# Patient Record
(demographics unavailable — no encounter records)

---

## 2024-11-12 NOTE — HISTORY OF PRESENT ILLNESS
[FreeTextEntry1] :  Patient presents for 3 month follow up. [de-identified] : IZABELLA DOAN is a 60 year female who presents today Nov 12, 2024 for 3 month follow up.   11/12/24: Doing well. She is switching GYN. She would like to start HRT. She has been doing a lot of menopause workshops. She is due for repeat bone density and mammogram. She has a wart on left ring finger. She has been putting compound W on it. It went away but came back.  8/13/24: She has been taking [place] thyroid to 75 mg/day, splitting the dose as follows: 60 mg am and 15 mg late afternoon as per Endocrinology recommendations. She has not missed any doses. She would like to no longer split the dose and take 75mg once a day in morning. She does not want to be switched to levothyroxine. She has osteoporosis not currently on medication treatment. She does weight bearing exercises 5-6 times a week. She recently saw cardiologist and has Us of carotid arteries and echocardiogram

## 2024-11-12 NOTE — PHYSICAL EXAM
[Normal] : normal rate, regular rhythm, normal S1 and S2 and no murmur heard [Coordination Grossly Intact] : coordination grossly intact [No Focal Deficits] : no focal deficits [Normal Gait] : normal gait [Normal Affect] : the affect was normal [Alert and Oriented x3] : oriented to person, place, and time [Normal Insight/Judgement] : insight and judgment were intact [de-identified] : +wart left ring finger

## 2024-11-12 NOTE — ASSESSMENT
[FreeTextEntry1] : IZABELLA DOAN is a 60 year female who presents today Nov 12, 2024 for 3 month follow up.   Hypothyroidism - recheck TFTs - Continue [place] thyroid 75mg/day splitting the dose as follows: 60 mg am and 15 mg late afternoon  Osteoporosis - Declined medication treatment - Repeat DEXA scan.  HLD - Aug 24: Total chol 240, , Triglycerides 82, HDL 82  Follow up in 3 months

## 2025-02-14 NOTE — ASSESSMENT
[FreeTextEntry1] : IZABELLA DOAN is a 60 year female who presents today Feb 14, 2025 for 3 month follow up for the following issues  HLD - Low fat diet - repeat lipids  Osteoporosis  - Bone Density Dec 2024 - Denies treatment at this time - Continue calcium, vitamin d, and weight training  Hypothyroidism - recheck tsh, t3, 74

## 2025-02-21 NOTE — CONSULT LETTER
[FreeTextEntry1] : Kirstin Michael [FreeTextEntry2] : hypothyroidism [FreeTextEntry3] : 59 year old female with h/o of hypothyroidism, who is on [place] thyroid (apparently declines treatment with levothyroxine).  Pt  last May had [place] thyroid changed to 60 mg in Am and 15 mg in PM   TSH was stable until recent labs showed TSH 5.55 Free T4 0.7  Free T3 4.41  PMH  Hypothyroidism  Osteoporosis refusing treatment  High cholesterol    PSH  blepharoplasty   Meds [place] Thyroid 60mg in Am and 15 mg in Afternoon  Estradiol cream   All   NKDA   [FreeTextEntry4] :  Pt withh/o hypothyroidism with variable  TFT  over past few years.  Pt had been fairly stable until recently. As noted with prior econsult- it can be very difficult to adjust [place] thyroid given variability between batches produced and Higher T3 content.    Would repeat TSH Free T4 Free T3 in 8 weeks with pt holding any Biotin containing supplements for 3 days prior  Can also check US thyroid  [FreeTextEntry5] : If there are any further questions, please reach out to me